# Patient Record
Sex: FEMALE | Race: WHITE | NOT HISPANIC OR LATINO | URBAN - METROPOLITAN AREA
[De-identification: names, ages, dates, MRNs, and addresses within clinical notes are randomized per-mention and may not be internally consistent; named-entity substitution may affect disease eponyms.]

---

## 2023-09-13 ENCOUNTER — EMERGENCY (EMERGENCY)
Facility: HOSPITAL | Age: 68
LOS: 1 days | Discharge: HOME | End: 2023-09-13
Attending: STUDENT IN AN ORGANIZED HEALTH CARE EDUCATION/TRAINING PROGRAM | Admitting: GENERAL PRACTICE

## 2023-09-13 VITALS
HEART RATE: 93 BPM | TEMPERATURE: 98 F | RESPIRATION RATE: 16 BRPM | OXYGEN SATURATION: 98 % | SYSTOLIC BLOOD PRESSURE: 150 MMHG | DIASTOLIC BLOOD PRESSURE: 74 MMHG

## 2023-09-13 DIAGNOSIS — E07.9 DISORDER OF THYROID, UNSPECIFIED: ICD-10-CM

## 2023-09-13 LAB
ALBUMIN SERPL ELPH-MCNC: 4 G/DL — SIGNIFICANT CHANGE UP (ref 3.3–5)
ALP SERPL-CCNC: 991 U/L — HIGH (ref 40–120)
ALT FLD-CCNC: 38 U/L — HIGH (ref 4–33)
ANION GAP SERPL CALC-SCNC: 13 MMOL/L — SIGNIFICANT CHANGE UP (ref 7–14)
AST SERPL-CCNC: 51 U/L — HIGH (ref 4–32)
BASOPHILS # BLD AUTO: 0.14 K/UL — SIGNIFICANT CHANGE UP (ref 0–0.2)
BASOPHILS NFR BLD AUTO: 1.8 % — SIGNIFICANT CHANGE UP (ref 0–2)
BILIRUB SERPL-MCNC: 0.4 MG/DL — SIGNIFICANT CHANGE UP (ref 0.2–1.2)
BLD GP AB SCN SERPL QL: NEGATIVE — SIGNIFICANT CHANGE UP
BUN SERPL-MCNC: 20 MG/DL — SIGNIFICANT CHANGE UP (ref 7–23)
CALCIUM SERPL-MCNC: 9.2 MG/DL — SIGNIFICANT CHANGE UP (ref 8.4–10.5)
CHLORIDE SERPL-SCNC: 101 MMOL/L — SIGNIFICANT CHANGE UP (ref 98–107)
CO2 SERPL-SCNC: 24 MMOL/L — SIGNIFICANT CHANGE UP (ref 22–31)
CREAT SERPL-MCNC: 0.87 MG/DL — SIGNIFICANT CHANGE UP (ref 0.5–1.3)
EGFR: 72 ML/MIN/1.73M2 — SIGNIFICANT CHANGE UP
EOSINOPHIL # BLD AUTO: 0.72 K/UL — HIGH (ref 0–0.5)
EOSINOPHIL NFR BLD AUTO: 9.1 % — HIGH (ref 0–6)
GLUCOSE SERPL-MCNC: 91 MG/DL — SIGNIFICANT CHANGE UP (ref 70–99)
HCT VFR BLD CALC: 30.1 % — LOW (ref 34.5–45)
HGB BLD-MCNC: 9 G/DL — LOW (ref 11.5–15.5)
IANC: 3.3 K/UL — SIGNIFICANT CHANGE UP (ref 1.8–7.4)
IMM GRANULOCYTES NFR BLD AUTO: 0.3 % — SIGNIFICANT CHANGE UP (ref 0–0.9)
LYMPHOCYTES # BLD AUTO: 3.22 K/UL — SIGNIFICANT CHANGE UP (ref 1–3.3)
LYMPHOCYTES # BLD AUTO: 40.7 % — SIGNIFICANT CHANGE UP (ref 13–44)
MCHC RBC-ENTMCNC: 25.7 PG — LOW (ref 27–34)
MCHC RBC-ENTMCNC: 29.9 GM/DL — LOW (ref 32–36)
MCV RBC AUTO: 86 FL — SIGNIFICANT CHANGE UP (ref 80–100)
MONOCYTES # BLD AUTO: 0.52 K/UL — SIGNIFICANT CHANGE UP (ref 0–0.9)
MONOCYTES NFR BLD AUTO: 6.6 % — SIGNIFICANT CHANGE UP (ref 2–14)
NEUTROPHILS # BLD AUTO: 3.3 K/UL — SIGNIFICANT CHANGE UP (ref 1.8–7.4)
NEUTROPHILS NFR BLD AUTO: 41.5 % — LOW (ref 43–77)
NRBC # BLD: 0 /100 WBCS — SIGNIFICANT CHANGE UP (ref 0–0)
NRBC # FLD: 0 K/UL — SIGNIFICANT CHANGE UP (ref 0–0)
PLATELET # BLD AUTO: 540 K/UL — HIGH (ref 150–400)
POTASSIUM SERPL-MCNC: 4 MMOL/L — SIGNIFICANT CHANGE UP (ref 3.5–5.3)
POTASSIUM SERPL-SCNC: 4 MMOL/L — SIGNIFICANT CHANGE UP (ref 3.5–5.3)
PROT SERPL-MCNC: 8 G/DL — SIGNIFICANT CHANGE UP (ref 6–8.3)
RBC # BLD: 3.5 M/UL — LOW (ref 3.8–5.2)
RBC # FLD: 16.1 % — HIGH (ref 10.3–14.5)
RH IG SCN BLD-IMP: POSITIVE — SIGNIFICANT CHANGE UP
SODIUM SERPL-SCNC: 138 MMOL/L — SIGNIFICANT CHANGE UP (ref 135–145)
T3 SERPL-MCNC: 86 NG/DL — SIGNIFICANT CHANGE UP (ref 80–200)
T4 AB SER-ACNC: 8.23 UG/DL — SIGNIFICANT CHANGE UP (ref 5.1–13)
T4 FREE SERPL-MCNC: 1.6 NG/DL — SIGNIFICANT CHANGE UP (ref 0.9–1.8)
TSH SERPL-MCNC: 16.64 UIU/ML — HIGH (ref 0.27–4.2)
WBC # BLD: 7.92 K/UL — SIGNIFICANT CHANGE UP (ref 3.8–10.5)
WBC # FLD AUTO: 7.92 K/UL — SIGNIFICANT CHANGE UP (ref 3.8–10.5)

## 2023-09-13 NOTE — ED PROVIDER NOTE - CLINICAL SUMMARY MEDICAL DECISION MAKING FREE TEXT BOX
pt sent in for abnormal blood count and thyroid panel; will assess in ED with cbc, cmp, thyroid panel; reassess

## 2023-09-13 NOTE — ED ADULT TRIAGE NOTE - CHIEF COMPLAINT QUOTE
Sent by her MD to be "stabilized". Liver enzymes might be elevated. Denies abdominal symptoms. Phx: HLD, hyperthyroidism

## 2023-09-13 NOTE — ED ADULT NURSE NOTE - OBJECTIVE STATEMENT
Sent by her MD to be "stabilized". Liver enzymes might be elevated. Denies abdominal symptoms. Phx: HLD, hyperthyroidism.  Patient A&OX4.  No distress noted.  Breathing non-labored.  Denies chest pain, no SOB noted.  Labs sent.  Right 20G IVL in place and tolerating well.  Plan of care in progress.

## 2023-09-13 NOTE — ED PROVIDER NOTE - NSFOLLOWUPINSTRUCTIONS_ED_ALL_ED_FT
Rest, stay hydrated, take all meds as previously prescribed, Followup with your PMD within 2 days for post hospital visit. Also recommend follow up with Endocrinology and hematology/oncology. You will be contacted to help establish these follow up appointments. Show your doctor and specialists all copies of labs given to you. Return for worsening symptoms, ex. fever, shortness of breath, chest pain, dizziness, palpitations, etc. Please read all the patient handouts.

## 2023-09-13 NOTE — ED PROVIDER NOTE - PATIENT PORTAL LINK FT
You can access the FollowMyHealth Patient Portal offered by [facility] by registering at the following website: [email or web address]. By joining [employer]’s FollowMyHealth portal, you will also be able to view your health information using other applications (apps) compatible with our system.

## 2023-09-13 NOTE — ED PROVIDER NOTE - OBJECTIVE STATEMENT
68 y/o F PMH hypothyroidism, HTN, HLD sent in by PMD office d/t anemia and abnormal thyroid testing. Pt states she had not had routine blood work in a long time. Pt notes she otherwise feels fine. Denies fever, chills, weight gain/loss, CP, SOB, palpitations, lightheadedness, melena, hematochezia.

## 2023-09-13 NOTE — ED PROVIDER NOTE - ATTENDING APP SHARED VISIT CONTRIBUTION OF CARE
I have personally performed a face to face medical and diagnostic evaluation of the patient. I have discussed with and reviewed the ACP's note and agree with the History, ROS, Physical Exam and MDM unless otherwise indicated. A brief summary of my personal evaluation and impression can be found below.      67-year-old female with no Past medical history of thyroid disorder, hypertension, hyperlipidemia sent in for anemia, abnormal thyroid testing, and elevated liver enzymes.  patient has no symptoms at this time.  States that she was told to come in.  Denies any history of daily alcohol use, IV drug use.  States that she has been taking naproxen, but not regularly.  Mainly for her arthritis.  On exam, vital signs stable, no focal findings.  Patient very well-appearing.  Plan for repeat labs.  Will call Dr. Morales to coordinate dispo, and see if he would like patient admitted for further work-up. Margaux Mccoy, ED Attending

## 2023-09-13 NOTE — ED ADULT NURSE NOTE - NSFALLUNIVINTERV_ED_ALL_ED
Bed/Stretcher in lowest position, wheels locked, appropriate side rails in place/Call bell, personal items and telephone in reach/Instruct patient to call for assistance before getting out of bed/chair/stretcher/Non-slip footwear applied when patient is off stretcher/[place] to call system/Physically safe environment - no spills, clutter or unnecessary equipment/Purposeful proactive rounding/Room/bathroom lighting operational, light cord in reach

## 2023-10-03 PROBLEM — Z00.00 ENCOUNTER FOR PREVENTIVE HEALTH EXAMINATION: Status: ACTIVE | Noted: 2023-10-03

## 2023-10-17 ENCOUNTER — APPOINTMENT (OUTPATIENT)
Dept: GASTROENTEROLOGY | Facility: CLINIC | Age: 68
End: 2023-10-17

## 2023-10-17 PROBLEM — E03.9 HYPOTHYROIDISM, UNSPECIFIED: Chronic | Status: ACTIVE | Noted: 2023-09-13

## 2023-10-17 PROBLEM — I10 ESSENTIAL (PRIMARY) HYPERTENSION: Chronic | Status: ACTIVE | Noted: 2023-09-13

## 2023-10-17 PROBLEM — E78.5 HYPERLIPIDEMIA, UNSPECIFIED: Chronic | Status: ACTIVE | Noted: 2023-09-13

## 2023-10-26 ENCOUNTER — APPOINTMENT (OUTPATIENT)
Dept: GASTROENTEROLOGY | Facility: CLINIC | Age: 68
End: 2023-10-26

## 2023-11-27 ENCOUNTER — EMERGENCY (EMERGENCY)
Facility: HOSPITAL | Age: 68
LOS: 1 days | Discharge: HOME | End: 2023-11-27
Attending: STUDENT IN AN ORGANIZED HEALTH CARE EDUCATION/TRAINING PROGRAM | Admitting: EMERGENCY MEDICINE
Payer: MEDICARE

## 2023-11-27 VITALS
RESPIRATION RATE: 17 BRPM | OXYGEN SATURATION: 100 % | SYSTOLIC BLOOD PRESSURE: 152 MMHG | TEMPERATURE: 98 F | DIASTOLIC BLOOD PRESSURE: 93 MMHG | HEART RATE: 84 BPM

## 2023-11-27 LAB
ALBUMIN SERPL ELPH-MCNC: 4.4 G/DL — SIGNIFICANT CHANGE UP (ref 3.3–5)
ALBUMIN SERPL ELPH-MCNC: 4.4 G/DL — SIGNIFICANT CHANGE UP (ref 3.3–5)
ALP SERPL-CCNC: 324 U/L — HIGH (ref 40–120)
ALP SERPL-CCNC: 324 U/L — HIGH (ref 40–120)
ALT FLD-CCNC: 20 U/L — SIGNIFICANT CHANGE UP (ref 4–33)
ALT FLD-CCNC: 20 U/L — SIGNIFICANT CHANGE UP (ref 4–33)
ANION GAP SERPL CALC-SCNC: 12 MMOL/L — SIGNIFICANT CHANGE UP (ref 7–14)
ANION GAP SERPL CALC-SCNC: 12 MMOL/L — SIGNIFICANT CHANGE UP (ref 7–14)
APAP SERPL-MCNC: <10 UG/ML — LOW (ref 15–25)
APAP SERPL-MCNC: <10 UG/ML — LOW (ref 15–25)
APTT BLD: 32.1 SEC — SIGNIFICANT CHANGE UP (ref 24.5–35.6)
APTT BLD: 32.1 SEC — SIGNIFICANT CHANGE UP (ref 24.5–35.6)
AST SERPL-CCNC: 29 U/L — SIGNIFICANT CHANGE UP (ref 4–32)
AST SERPL-CCNC: 29 U/L — SIGNIFICANT CHANGE UP (ref 4–32)
BASOPHILS # BLD AUTO: 0.07 K/UL — SIGNIFICANT CHANGE UP (ref 0–0.2)
BASOPHILS # BLD AUTO: 0.07 K/UL — SIGNIFICANT CHANGE UP (ref 0–0.2)
BASOPHILS NFR BLD AUTO: 0.6 % — SIGNIFICANT CHANGE UP (ref 0–2)
BASOPHILS NFR BLD AUTO: 0.6 % — SIGNIFICANT CHANGE UP (ref 0–2)
BILIRUB SERPL-MCNC: 0.3 MG/DL — SIGNIFICANT CHANGE UP (ref 0.2–1.2)
BILIRUB SERPL-MCNC: 0.3 MG/DL — SIGNIFICANT CHANGE UP (ref 0.2–1.2)
BUN SERPL-MCNC: 26 MG/DL — HIGH (ref 7–23)
BUN SERPL-MCNC: 26 MG/DL — HIGH (ref 7–23)
CALCIUM SERPL-MCNC: 9.4 MG/DL — SIGNIFICANT CHANGE UP (ref 8.4–10.5)
CALCIUM SERPL-MCNC: 9.4 MG/DL — SIGNIFICANT CHANGE UP (ref 8.4–10.5)
CHLORIDE SERPL-SCNC: 98 MMOL/L — SIGNIFICANT CHANGE UP (ref 98–107)
CHLORIDE SERPL-SCNC: 98 MMOL/L — SIGNIFICANT CHANGE UP (ref 98–107)
CO2 SERPL-SCNC: 28 MMOL/L — SIGNIFICANT CHANGE UP (ref 22–31)
CO2 SERPL-SCNC: 28 MMOL/L — SIGNIFICANT CHANGE UP (ref 22–31)
CREAT SERPL-MCNC: 0.78 MG/DL — SIGNIFICANT CHANGE UP (ref 0.5–1.3)
CREAT SERPL-MCNC: 0.78 MG/DL — SIGNIFICANT CHANGE UP (ref 0.5–1.3)
EGFR: 82 ML/MIN/1.73M2 — SIGNIFICANT CHANGE UP
EGFR: 82 ML/MIN/1.73M2 — SIGNIFICANT CHANGE UP
EOSINOPHIL # BLD AUTO: 0.11 K/UL — SIGNIFICANT CHANGE UP (ref 0–0.5)
EOSINOPHIL # BLD AUTO: 0.11 K/UL — SIGNIFICANT CHANGE UP (ref 0–0.5)
EOSINOPHIL NFR BLD AUTO: 0.9 % — SIGNIFICANT CHANGE UP (ref 0–6)
EOSINOPHIL NFR BLD AUTO: 0.9 % — SIGNIFICANT CHANGE UP (ref 0–6)
GGT SERPL-CCNC: 356 U/L — HIGH (ref 5–36)
GGT SERPL-CCNC: 356 U/L — HIGH (ref 5–36)
GLUCOSE SERPL-MCNC: 92 MG/DL — SIGNIFICANT CHANGE UP (ref 70–99)
GLUCOSE SERPL-MCNC: 92 MG/DL — SIGNIFICANT CHANGE UP (ref 70–99)
HCT VFR BLD CALC: 33.3 % — LOW (ref 34.5–45)
HCT VFR BLD CALC: 33.3 % — LOW (ref 34.5–45)
HGB BLD-MCNC: 10.4 G/DL — LOW (ref 11.5–15.5)
HGB BLD-MCNC: 10.4 G/DL — LOW (ref 11.5–15.5)
IANC: 7.41 K/UL — HIGH (ref 1.8–7.4)
IANC: 7.41 K/UL — HIGH (ref 1.8–7.4)
IMM GRANULOCYTES NFR BLD AUTO: 1 % — HIGH (ref 0–0.9)
IMM GRANULOCYTES NFR BLD AUTO: 1 % — HIGH (ref 0–0.9)
INR BLD: 1.14 RATIO — SIGNIFICANT CHANGE UP (ref 0.85–1.18)
INR BLD: 1.14 RATIO — SIGNIFICANT CHANGE UP (ref 0.85–1.18)
LDH SERPL L TO P-CCNC: 160 U/L — SIGNIFICANT CHANGE UP (ref 135–225)
LDH SERPL L TO P-CCNC: 160 U/L — SIGNIFICANT CHANGE UP (ref 135–225)
LYMPHOCYTES # BLD AUTO: 3.56 K/UL — HIGH (ref 1–3.3)
LYMPHOCYTES # BLD AUTO: 3.56 K/UL — HIGH (ref 1–3.3)
LYMPHOCYTES # BLD AUTO: 30.2 % — SIGNIFICANT CHANGE UP (ref 13–44)
LYMPHOCYTES # BLD AUTO: 30.2 % — SIGNIFICANT CHANGE UP (ref 13–44)
MCHC RBC-ENTMCNC: 26 PG — LOW (ref 27–34)
MCHC RBC-ENTMCNC: 26 PG — LOW (ref 27–34)
MCHC RBC-ENTMCNC: 31.2 GM/DL — LOW (ref 32–36)
MCHC RBC-ENTMCNC: 31.2 GM/DL — LOW (ref 32–36)
MCV RBC AUTO: 83.3 FL — SIGNIFICANT CHANGE UP (ref 80–100)
MCV RBC AUTO: 83.3 FL — SIGNIFICANT CHANGE UP (ref 80–100)
MONOCYTES # BLD AUTO: 0.51 K/UL — SIGNIFICANT CHANGE UP (ref 0–0.9)
MONOCYTES # BLD AUTO: 0.51 K/UL — SIGNIFICANT CHANGE UP (ref 0–0.9)
MONOCYTES NFR BLD AUTO: 4.3 % — SIGNIFICANT CHANGE UP (ref 2–14)
MONOCYTES NFR BLD AUTO: 4.3 % — SIGNIFICANT CHANGE UP (ref 2–14)
NEUTROPHILS # BLD AUTO: 7.41 K/UL — HIGH (ref 1.8–7.4)
NEUTROPHILS # BLD AUTO: 7.41 K/UL — HIGH (ref 1.8–7.4)
NEUTROPHILS NFR BLD AUTO: 63 % — SIGNIFICANT CHANGE UP (ref 43–77)
NEUTROPHILS NFR BLD AUTO: 63 % — SIGNIFICANT CHANGE UP (ref 43–77)
NRBC # BLD: 0 /100 WBCS — SIGNIFICANT CHANGE UP (ref 0–0)
NRBC # BLD: 0 /100 WBCS — SIGNIFICANT CHANGE UP (ref 0–0)
NRBC # FLD: 0 K/UL — SIGNIFICANT CHANGE UP (ref 0–0)
NRBC # FLD: 0 K/UL — SIGNIFICANT CHANGE UP (ref 0–0)
PLATELET # BLD AUTO: 796 K/UL — HIGH (ref 150–400)
PLATELET # BLD AUTO: 796 K/UL — HIGH (ref 150–400)
POTASSIUM SERPL-MCNC: 3.6 MMOL/L — SIGNIFICANT CHANGE UP (ref 3.5–5.3)
POTASSIUM SERPL-MCNC: 3.6 MMOL/L — SIGNIFICANT CHANGE UP (ref 3.5–5.3)
POTASSIUM SERPL-SCNC: 3.6 MMOL/L — SIGNIFICANT CHANGE UP (ref 3.5–5.3)
POTASSIUM SERPL-SCNC: 3.6 MMOL/L — SIGNIFICANT CHANGE UP (ref 3.5–5.3)
PROT SERPL-MCNC: 7.8 G/DL — SIGNIFICANT CHANGE UP (ref 6–8.3)
PROT SERPL-MCNC: 7.8 G/DL — SIGNIFICANT CHANGE UP (ref 6–8.3)
PROTHROM AB SERPL-ACNC: 12.7 SEC — SIGNIFICANT CHANGE UP (ref 9.5–13)
PROTHROM AB SERPL-ACNC: 12.7 SEC — SIGNIFICANT CHANGE UP (ref 9.5–13)
RBC # BLD: 4 M/UL — SIGNIFICANT CHANGE UP (ref 3.8–5.2)
RBC # BLD: 4 M/UL — SIGNIFICANT CHANGE UP (ref 3.8–5.2)
RBC # FLD: 17.6 % — HIGH (ref 10.3–14.5)
RBC # FLD: 17.6 % — HIGH (ref 10.3–14.5)
SODIUM SERPL-SCNC: 138 MMOL/L — SIGNIFICANT CHANGE UP (ref 135–145)
SODIUM SERPL-SCNC: 138 MMOL/L — SIGNIFICANT CHANGE UP (ref 135–145)
WBC # BLD: 11.78 K/UL — HIGH (ref 3.8–10.5)
WBC # BLD: 11.78 K/UL — HIGH (ref 3.8–10.5)
WBC # FLD AUTO: 11.78 K/UL — HIGH (ref 3.8–10.5)
WBC # FLD AUTO: 11.78 K/UL — HIGH (ref 3.8–10.5)

## 2023-11-27 PROCEDURE — 99285 EMERGENCY DEPT VISIT HI MDM: CPT

## 2023-11-27 PROCEDURE — 93010 ELECTROCARDIOGRAM REPORT: CPT

## 2023-11-27 PROCEDURE — 76705 ECHO EXAM OF ABDOMEN: CPT | Mod: 26

## 2023-11-27 RX ORDER — ALPRAZOLAM 0.25 MG
1 TABLET ORAL ONCE
Refills: 0 | Status: DISCONTINUED | OUTPATIENT
Start: 2023-11-27 | End: 2023-11-27

## 2023-11-27 RX ADMIN — Medication 1 MILLIGRAM(S): at 23:24

## 2023-11-27 NOTE — ED PROVIDER NOTE - PHYSICAL EXAMINATION
GENERAL: NAD, lying in bed comfortably  HEAD:  Atraumatic, normocephalic  EYES: EOMI, PERRLA, conjunctiva and sclera clear  Face: desquamated skin on chin with skin appearing pink no blisters  NECK: Supple, trachea midline, no JVD  HEART: Regular rate and rhythm, no murmurs, rubs, or gallops  LUNGS: Unlabored respirations.  Clear to auscultation bilaterally, no crackles, wheezing, or rhonchi  ABDOMEN: Soft, nontender, nondistended, +BS  EXTREMITIES: 2+ peripheral pulses bilaterally. No clubbing, cyanosis, or edema; Left hand actively desquamating with old scabbed blisters  NERVOUS SYSTEM:  A&Ox3, moving all extremities, no focal deficits   SKIN: No rashes or lesions

## 2023-11-27 NOTE — ED PROVIDER NOTE - PROGRESS NOTE DETAILS
[facility] PGY3: Called Dr Grigsby multiple times, left msg for call back. @ [phone]. Called office call line as well w/o response. Unable to elicit further information. Consider CDU for possible endocrine given the concern for elevated TSH despite med adjustment. Pt agreeable to stay for this if necessary. Resend TSH. If TSH improving, ok to dc likely with f/u to endo/GI. Salazar PGY3: TSH >36, will keep in CDU for endo f/u as patient has poor f/u at home.

## 2023-11-27 NOTE — ED PROVIDER NOTE - ATTENDING CONTRIBUTION TO CARE
I have personally performed a face to face medical and diagnostic evaluation of the patient. I have discussed with and reviewed the Resident's note and agree with the History, ROS, Physical Exam and MDM unless otherwise indicated. A brief summary of my personal evaluation and impression can be found below.    Aliya PERRY: 68F hx of hypothyroidism, htn, hld, presents with a cc of c/f elevated liver enzymes, pt reports has been following with her pmd for the last few weeks and was told to come to ED for eval given c/f high liver enzymes reports decided to come today because now she was ready to stay in hospital she also reports she developed a rash on her face that spread to her hands, no fever. Pt also reports she has had a itchy rash to her legs and her rash on her hands is itchy as well. Pt also reports she has been poorly compliant with her thyroid medications as well. No new chest pain sob or garcia no nausea no vomiting. Despite multiple attempts at clarification pt is intermittently tangental, unclear exactly why she came to ED today specifically, but does not appear she has acutely worsening symptoms, no recent travel.     All other ROS negative, except as above and as per HPI and ROS section.    VITALS: Initial triage and subsequent vitals have been reviewed by me.  GEN APPEARANCE: Alert, non-toxic, well-appearing, NAD.  HEAD: Atraumatic.  EYES: PERRLa, EOMI, vision grossly intact.   NECK: Supple  CV: RRR, S1S2, no c/r/m/g. Cap refill <2 seconds. No bruits.   LUNGS: CTAB. No abnormal breath sounds.  ABDOMEN: Soft, NTND. No guarding or rebound.   MSK/EXT: No spinal or extremity point tenderness. No CVA ttp. Pelvis stable. No peripheral edema.  NEURO: Alert, follows commands. Weight bearing normal. Speech normal. Sensation and motor normal x4 extremities.   SKIN: trace desquamating skin rash w mild excoriations and erythremia to b/l hands, appears c/w itch and scratching   PSYCH: Appropriate    Plan/MDM: exam vss non toxic PE as above ddx c/f abnormal lab tests rash appears c/w possible dermatitis w itch component possibly w superimposed cellulitis though is cool to touch, given limited history and unclear prior outpatient work up will check basic labs, ruq sono give meds as needed, reassess, attempt to reach pmd for collateral and dispo planning, check thyroid, reassess, dispo accordingly thereafter,

## 2023-11-27 NOTE — ED ADULT NURSE NOTE - NSFALLUNIVINTERV_ED_ALL_ED
Bed/Stretcher in lowest position, wheels locked, appropriate side rails in place/Call bell, personal items and telephone in reach/Instruct patient to call for assistance before getting out of bed/chair/stretcher/Non-slip footwear applied when patient is off stretcher/[place] to call system/Physically safe environment - no spills, clutter or unnecessary equipment/Purposeful proactive rounding/Room/bathroom lighting operational, light cord in reach

## 2023-11-27 NOTE — ED PROVIDER NOTE - NSFOLLOWUPINSTRUCTIONS_ED_ALL_ED_FT
Abnormal Labs    - Follow up with GASTROENTEROLOGY about liver tests.  - Follow up with an ENDOCRINOLOGIST about thyroid issues. Please call below clinic number to make an appointment.    - Follow up with Dr Grigsby as appropriate for further care.    Rest, drink plenty of fluids.  Advance activity as tolerated.  Continue all previously prescribed medications as directed.  Follow up with your primary care physician in 48-72 hours- bring copies of your results.  Return to the ER for worsening or persistent symptoms, and/or ANY NEW OR CONCERNING SYMPTOMS.

## 2023-11-27 NOTE — ED ADULT TRIAGE NOTE - CHIEF COMPLAINT QUOTE
pt states he was told her liver enzymes were elevated. pt also arrives with rash to Left arm/hand with peeling skin with lesions.

## 2023-11-27 NOTE — ED PROVIDER NOTE - OBJECTIVE STATEMENT
68-year-old female PMH hypothyroidism hypertension hyperlipidemia presenting at PMD request for elevated liver enzymes and rash on hands  Patient states 3 to 4 weeks ago she was at her PMDs office elevated liver enzymes and was requested to come into the emergency department for further evaluation.  Patient did not go at that time and came today after physicians repeated requests due to rash on hands.  Patient developed a rash on hands and face on Monday which she attributes to either using Clorox without gloves rash on hand and resembles previous rashes and taking a medication which made her sweaty and nauseous in the past.  Patient developed a rash on chin and blistering rash with edema to left hand.  Patient's skin desquamated of the chin and has some skin desquamation on the left hand with scabbed over blisters.  The patient visited her PMD on Tuesday who prescribed her with a steroid taper started at 40 patient is currently on 20 twice daily.  Patient states rash on face and hand are improving.  Patient denies chest pain shortness of breath abdominal pain nausea vomiting diarrhea paresthesias rash or blisters in other locations headache dizziness.

## 2023-11-27 NOTE — ED ADULT NURSE NOTE - OBJECTIVE STATEMENT
Pt arrives to 12a, ambulatory at baseline, A&Ox4. Pt C/O a rash x3 days with peeling skin and lesion on the left wrist/ hand x a few days. Pt states "My liver enzymes are elevated". Pt denies CP, HA, SOB, fevers, chills, vomiting, and diarrhea. Pt denies eating new foods. Respirations are even and unlabored, NSR on monitor. Abdomen is soft and nondistended. Capillary refill is 2 seconds bilaterally. 20G IV placed in right AC. Labs drawn and sent. Medicated as per MD order. Bed in lowest position, safety maintained.

## 2023-11-28 VITALS
TEMPERATURE: 98 F | OXYGEN SATURATION: 100 % | SYSTOLIC BLOOD PRESSURE: 116 MMHG | RESPIRATION RATE: 16 BRPM | DIASTOLIC BLOOD PRESSURE: 64 MMHG | HEART RATE: 67 BPM

## 2023-11-28 LAB
T3 SERPL-MCNC: <30 NG/DL — LOW (ref 80–200)
T3 SERPL-MCNC: <30 NG/DL — LOW (ref 80–200)
T4 AB SER-ACNC: 2.8 UG/DL — LOW (ref 5.1–13)
T4 AB SER-ACNC: 2.8 UG/DL — LOW (ref 5.1–13)
T4 FREE SERPL-MCNC: 0.5 NG/DL — LOW (ref 0.9–1.8)
T4 FREE SERPL-MCNC: 0.5 NG/DL — LOW (ref 0.9–1.8)
TSH SERPL-MCNC: 36.98 UIU/ML — HIGH (ref 0.27–4.2)
TSH SERPL-MCNC: 36.98 UIU/ML — HIGH (ref 0.27–4.2)

## 2023-11-28 PROCEDURE — 99236 HOSP IP/OBS SAME DATE HI 85: CPT

## 2023-11-28 RX ORDER — LEVOTHYROXINE SODIUM 125 MCG
100 TABLET ORAL DAILY
Refills: 0 | Status: DISCONTINUED | OUTPATIENT
Start: 2023-11-28 | End: 2023-11-28

## 2023-11-28 RX ORDER — LEVOTHYROXINE SODIUM 125 MCG
125 TABLET ORAL DAILY
Refills: 0 | Status: DISCONTINUED | OUTPATIENT
Start: 2023-11-28 | End: 2023-12-01

## 2023-11-28 RX ORDER — ALPRAZOLAM 0.25 MG
1 TABLET ORAL ONCE
Refills: 0 | Status: DISCONTINUED | OUTPATIENT
Start: 2023-11-28 | End: 2023-11-28

## 2023-11-28 RX ORDER — ALPRAZOLAM 0.25 MG
1 TABLET ORAL
Refills: 0 | Status: COMPLETED | OUTPATIENT
Start: 2023-11-28 | End: 2023-12-05

## 2023-11-28 RX ADMIN — Medication 500 MILLIGRAM(S): at 00:39

## 2023-11-28 RX ADMIN — Medication 1 MILLIGRAM(S): at 11:08

## 2023-11-28 RX ADMIN — Medication 20 MILLIGRAM(S): at 11:08

## 2023-11-28 RX ADMIN — Medication 500 MILLIGRAM(S): at 01:09

## 2023-11-28 NOTE — ED CDU PROVIDER DISPOSITION NOTE - NSFOLLOWUPINSTRUCTIONS_ED_ALL_ED_FT
REST, NO STRENUOUS ACTIVITY  PLEASE CONTINUE ALL CURRENT MEDICATIONS INCLUDING YOUR LEVOTHYROXINE 125MCG DAILY  PLEASE START TRIAMCINOLONE OINTMENT TWICE A A DAY AS DIRECTED  FOLLOW UP WITH DR. JUAREZ NEXT WEEK  RETURN TO ER FOR WORSENING SYMPTOMS     Rash    A rash is a change in the color of the skin. A rash can also change the way your skin feels. There are many different conditions and factors that can cause a rash.     Follow these instructions at home:  Pay attention to any changes in your symptoms. Follow these instructions to help with your condition:    Medicine    Take or apply over-the-counter and prescription medicines only as told by your health care provider. These may include:    Corticosteroid cream.  Anti-itch lotions.  Oral antihistamines.    Skin Care    Apply cool compresses to the affected areas.  Try taking a bath with:  Epsom salts. Follow the instructions on the packaging. You can get these at your local pharmacy or grocery store.  Baking soda. Pour a small amount into the bath as told by your health care provider.  Colloidal oatmeal. Follow the instructions on the packaging. You can get this at your local pharmacy or grocery store.  Try applying baking soda paste to your skin. Stir water into baking soda until it reaches a paste-like consistency.  Do not scratch or rub your skin.  Avoid covering the rash. Make sure the rash is exposed to air as much as possible.    General instructions    Avoid hot showers or baths, which can make itching worse. A cold shower may help.  Avoid scented soaps, detergents, and perfumes. Use gentle soaps, detergents, perfumes, and other cosmetic products.  Avoid any substance that causes your rash. Keep a journal to help track what causes your rash. Write down:  What you eat.  What cosmetic products you use.  What you drink.  What you wear. This includes jewelry.  Keep all follow-up visits as told by your health care provider. This is important.    Contact a health care provider if:  You sweat at night.  You lose weight.  You urinate more than normal.  You feel weak.  You vomit.  Your skin or the whites of your eyes look yellow (jaundice).  Your skin:  Tingles.  Is numb.  Your rash:  Does not go away after several days.  Gets worse.  You are:  Unusually thirsty.  More tired than normal.  You have:  New symptoms.  Pain in your abdomen.  A fever.  Diarrhea.    Get help right away if:  You develop a rash that covers all or most of your body. The rash may or may not be painful.  You develop blisters that:  Are on top of the rash.  Grow larger or grow together.  Are painful.  Are inside your nose or mouth.  You develop a rash that:  Looks like purple pinprick-sized spots all over your body.  Has a “bull's eye” or looks like a target.  Is not related to sun exposure, is red and painful, and causes your skin to peel.    ADDITIONAL NOTES AND INSTRUCTIONS    Please follow up with your Primary MD in 24-48 hr.  Seek immediate medical care for any new/worsening signs or symptoms.     Document Released: 12/8/2003 Document Revised: 5/23/2017 Document Reviewed: 5/4/2016  [employer] Interactive Patient Education ©2019 Elsevier Inc. This information is not intended to replace advice given to you by your health care provider. Make sure you discuss any questions you have with your health care provider.    Dermatología [facility]  Dermatología  [address] [place] [address]  Teléfono: [phone]  Fax: [phone]    [facility]ía [facility]  [facility]  [address] [address]  Teléfono: [phone]  Fax: [phone]    Dermatología [facility]  Dermatología  [address] [address]  Teléfono: [phone]  Fax:  Tiempo de seguimiento:    [facility] [place]  Dermatología  [address] [place] [address]  Teléfono: [phone]  Fax: [phone]  Tiempo de seguimiento:    Erupción    Michael erupción es un cambio en el color de la piel. Michael erupción también puede cambiar la forma en que se siente la piel. Hay muchas condiciones y factores diferentes que pueden causar michael erupción.    Siga estas instrucciones en casa:  Preste atención a cualquier cambio en nury síntomas. Siga estas instrucciones para ayudar con diez condición:    Medicamento    Pentress o aplique medicamentos de venta ira y recetados solo según lo indique diez proveedor de atención médica. Estos pueden incluir:    Crema de corticosteroides.  Lociones anti-picazón.  Antihistamínicos orales.    Protección de la piel    Aplique compresas frías en las áreas afectadas.  Intente wallace un baño con:  Sales de Epsom. Siga las instrucciones del paquete. Puede conseguirlos en diez farmacia o supermercado local.  Bicarbonato de sodio. Vierta michael pequeña cantidad en el baño según le indique diez proveedor de atención médica.  Virgen coloidal. Siga las instrucciones del paquete. Puede conseguirlo en diez farmacia o supermercado local.  Intente aplicar pasta de bicarbonato de sodio en diez piel. Agrega agua al bicarbonato de sodio hasta que tenga michael consistencia pastosa.  No se rasque ni frote la piel.  Evite cubrir la erupción. Asegúrese de que la erupción esté expuesta al aire tanto rakesh sea posible.    Instrucciones generales    Evite las duchas o kenna calientes, que pueden empeorar la picazón. Michael ducha fría puede ayudar.  Evite los jabones, detergentes y perfumes perfumados. Use jabones, detergentes, perfumes y otros productos cosméticos suaves.  Evite cualquier sustancia que le cause sarpullido. Lleve un diario para ayudar a rastrear las causas de diez erupción. Anote:  Lo que comes.  Qué productos cosméticos usas.  Que estas tomando.  Lo que vistes. Lawson incluye joyas.  Asista a todas las visitas de seguimiento que le indique diez proveedor de atención médica. Lawson es importante.    Comuníquese con un proveedor de atención médica si:  Sudas de noche.  Tú pierdes peso.  Orinas más de lo normal.  Te sientes débil.  Vomitas.  Diez piel o el taylor de nury ojos se omar amarillos (ictericia).  Tu piel:  Hormigueo  Está entumecido.  Tu sarpullido:  No desaparece después de varios días.  Empeora.  Usted está:  Inusualmente sediento.  Más cansado de lo normal.  Tu tienes:  Nuevos síntomas.  Dolor en diez abdomen.  Fiebre  Diarrea.    Obtenga ayuda de inmediato si:  Desarrolla michael erupción que cubre todo o la mayor parte de diez cuerpo. La erupción puede ser dolorosa o no.  Desarrolla ampollas que:  Están encima del sarpullido.  Crezcan más o crezcan juntos.  Son dolorosos.  Están dentro de diez nariz o boca.  Desarrolla michael erupción que:  Parece manchas moradas del tamaño de un alfiler en todo el cuerpo.  Tiene un "josey de buey" o parece un objetivo.  No está relacionado con la exposición al sol, es dyson y doloroso, y hace que diez piel se pele.    NOTAS E INSTRUCCIONES ADICIONALES    Nicol un seguimiento con diez médico de cabecera en 24-48 horas.  Busque atención médica inmediata ante cualquier signo o síntoma nuevo o que empeore.    Documento publicado: 8/12/2003 Documento revisado: 23/5/2017 Documento revisado: 4/5/2016  Educación interactiva para el paciente de Elsevier © 2019 Elsevier Inc. Esta información no pretende reemplazar los consejos que le haya brindado diez proveedor de atención médica. Asegúrese de discutir cualquier pregunta que tenga con diez proveedor de atención médica.

## 2023-11-28 NOTE — ED CDU PROVIDER INITIAL DAY NOTE - ATTENDING APP SHARED VISIT CONTRIBUTION OF CARE
I have personally performed a face to face medical and diagnostic evaluation of the patient. I have discussed with and reviewed the HEATHER's note and agree with the History, ROS, Physical Exam and MDM unless otherwise indicated. A brief summary of my personal evaluation and impression can be found below.    Please see initial ED provider note for further details.

## 2023-11-28 NOTE — ED CDU PROVIDER INITIAL DAY NOTE - PHYSICAL EXAMINATION
CONSTITUTIONAL: Well-appearing; well-nourished obese female; in no apparent distress. Non-toxic appearing.   NEURO: Alert & oriented. Gait steady without assistance. Sensory and motor functions are grossly intact.  PSYCH: Mood appropriate. Thought processes intact.   NECK: Supple  CARD: Regular rate and rhythm, no murmurs  RESP: No accessory muscle use; breath sounds clear and equal bilaterally; no wheezes, rhonchi, or rales     ABD: Rotund. Soft; non-distended; non-tender. No guarding or rebound.   MUSCULOSKELETAL/EXTREMITIES: FROM in all four extremities; no extremity edema.  SKIN: Desquamating skin noted to left hand with scabs. No blisters.

## 2023-11-28 NOTE — ED CDU PROVIDER DISPOSITION NOTE - PATIENT PORTAL LINK FT
You can access the FollowMyHealth Patient Portal offered by [facility] by registering at the following website: [email or web address]. By joining [employer]’s FollowMyHealth portal, you will also be able to view your health information using other applications (apps) compatible with our system.

## 2023-11-28 NOTE — ED CDU PROVIDER INITIAL DAY NOTE - NS ED MD PROGRESS NOTE ADD
Tai Florence is a 9 m.o.  male with:   1. Supracristal ventricular septal defect and coarctation of the aorta  - s/p patch closure of ventricular septal defect, primary closure of patent foramen ovale and coarctation repair with end to end anastomosis and anterior patch augmentation (9/23/22)   2. Bicuspid aortic valve  - trivial to mild insufficiency  3. 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 RLL atelectasis, improving    Plan:  Neuro:   - Prn Ibuprofen and tylenol   - Morphine and oxycodone prn  - Melatonin prn  Resp:   - Goal sat > 92%, may have oxygen as needed  - Ventilation plan: room air  - Daily CXR  - Dexamethasone - plan for slow taper for 3 more days  - CPT  CVS:   - Goal SBP < 120 mmHg  - Inotropic support: none  - Hydralyzine prn SBP >120 mmHg  - Echo prior to discharge  - Rhythm: Sinus  - Lasix PO q8  - Enalapril to 0.2 mg/kg/day  FEN/GI:   - NG feeds: Enfamil 20 kcal/oz 180 ml q4  - Speech evaluation/MBSS later this week  - Monitor electrolytes and replace as needed  - GI prophylaxis: Famotidine PO  - Miralax prn  Heme/ID:  - Goal Hct> 30  - Anticoagulation needs: None  - S/p Ancef prophylaxis   Plastics:  - PIV, prelim plan to dc cvl   Add Progress Note...

## 2023-11-28 NOTE — ED CDU PROVIDER INITIAL DAY NOTE - PROGRESS NOTE DETAILS
Patient has no specific complaints this morning.  Although patient results discussed.  Including LFTs improvement and ultrasound results.  Patient states that as she has not been feeling well for the last 2 weeks she has not been taking her Synthroid for her cholesterol medication.  Patient's TSH was extremely elevated and rest of thyroid test were low.  Will restart patient on her Synthroid 100 mg and will speak with Pato Pedersen this morning to discuss follow-up plan.  On exam patient has no significant abdominal tenderness and vital signs are unremarkable.

## 2023-11-28 NOTE — ED CDU PROVIDER INITIAL DAY NOTE - OBJECTIVE STATEMENT
68-year-old female PMH hypothyroidism hypertension hyperlipidemia presenting at PMD request for elevated liver enzymes and rash on hands  Patient states 3 to 4 weeks ago she was at her PMDs office elevated liver enzymes and was requested to come into the emergency department for further evaluation.  Patient did not go at that time and came today after physicians repeated requests due to rash on hands.  Patient developed a rash on hands and face on Monday which she attributes to either using Clorox without gloves rash on hand and resembles previous rashes and taking a medication which made her sweaty and nauseous in the past.  Patient developed a rash on chin and blistering rash with edema to left hand.   The patient visited her PMD on Tuesday (1 weeks ago) who prescribed her with a steroid taper started at 40 patient is currently on 20 twice daily.  Patient states rash on face and hand are improving. Patient denies chest pain shortness of breath abdominal pain nausea vomiting diarrhea paresthesias rash or blisters in other locations headache dizziness.    CDU AG Cummings: Agree with above.  Upon chart review liver enzymes have been elevated dating back to 2 months and was noted to be higher than they are today.  Upon further questioning, patient states that rash and elevated liver enzymes have been ongoing for the past several weeks but patient did not have the time to come into the hospital like her doctor requested.  Patient is followed by Dr. Morales outside ([phone]).  In the ED, patient underwent lab analysis and upper quadrant ultrasound.  Labs are significant for elevated TSH of 36.9 with subtherapeutic thyroid levels.  Right upper quadrant ultrasound within normal limits, incidental finding of 2 cm complex renal interpolar cyst noted.  Patient placed in CDU for endocrine consult given poorly controlled thyroid disease.  Will also attempt to  reach patient's primary care doctor to gain clarity as to why he sent patient to ED for admission as patient is unable to clarify.

## 2023-11-28 NOTE — ED CDU PROVIDER INITIAL DAY NOTE - CLINICAL SUMMARY MEDICAL DECISION MAKING FREE TEXT BOX
68-year-old female PMH hypothyroidism hypertension hyperlipidemia presenting at PMD request for elevated liver enzymes and rash on hands  Patient states 3 to 4 weeks ago she was at her PMDs office elevated liver enzymes and was requested to come into the emergency department for further evaluation. In the ED, patient underwent lab analysis and upper quadrant ultrasound.  Labs are significant for elevated TSH of 36.9 with subtherapeutic thyroid levels.  Right upper quadrant ultrasound within normal limits, incidental finding of 2 cm complex renal interpolar cyst noted.  Patient placed in CDU for endocrine consult given poorly controlled thyroid disease.  Will also attempt to  reach patient's primary care doctor to gain clarity as to why he sent patient to ED for admission as patient is unable to clarify.

## 2023-11-28 NOTE — ED CDU PROVIDER DISPOSITION NOTE - CLINICAL COURSE
68-year-old female PMH hypothyroidism hypertension hyperlipidemia presenting at PMD request for elevated liver enzymes and rash on handsPatient states 3 to 4 weeks ago she was at her PMDs office elevated liver enzymes and was requested to come into the emergency department for further evaluation.  Patient did not go at that time and came today after physicians repeated requests due to rash on hands.    The patient visited her PMD on Tuesday (1 weeks ago) who prescribed her with a steroid taper started at 40 patient is currently on 20 twice daily.  Patient states rash on face and hand are improving. Upon chart review liver enzymes have been elevated dating back to 2 months and was noted to be higher than they are today.  Upon further questioning, patient states that rash and elevated liver enzymes have been ongoing for the past several weeks but patient did not have the time to come into the hospital like her doctor requested.  Patient is followed by Dr. Morales outside ([phone]).  In the ED, patient underwent lab analysis and upper quadrant ultrasound.  Labs are significant for elevated TSH of 36.9 with subtherapeutic thyroid levels.  Right upper quadrant ultrasound within normal limits, incidental finding of 2 cm complex renal interpolar cyst noted.  Patient placed in CDU for endocrine consult given poorly controlled thyroid disease.  Spoke with Dr. Moraels - initially sent patient to ER for derm eval. Spoke with derm and sent picture - recommending dc on triamcinolone and outpt follow up.

## 2023-11-28 NOTE — ED CDU PROVIDER INITIAL DAY NOTE - NSICDXPASTSURGICALHX_GEN_ALL_CORE_FT
Medication: Methylphenidate HCl ER 54 MG TABLET SR 24 HR        Per patient's insurance, Methylphenidate HCl ER 54 MG TABLET SR 24 HR is excluded from coverage.    See the list below for the medications that are covered by patient's insurance:      Tammy German     Please advise. When changing to a preferred medication send a new rx to patient's pharmacy and update this encounter. Thanks.     PAST SURGICAL HISTORY:  No significant past surgical history

## 2023-12-12 ENCOUNTER — APPOINTMENT (OUTPATIENT)
Dept: GASTROENTEROLOGY | Facility: CLINIC | Age: 68
End: 2023-12-12
Payer: MEDICARE

## 2023-12-12 VITALS
DIASTOLIC BLOOD PRESSURE: 67 MMHG | HEIGHT: 63 IN | WEIGHT: 170 LBS | TEMPERATURE: 97.6 F | OXYGEN SATURATION: 97 % | SYSTOLIC BLOOD PRESSURE: 139 MMHG | BODY MASS INDEX: 30.12 KG/M2 | HEART RATE: 93 BPM

## 2023-12-12 DIAGNOSIS — Z80.0 FAMILY HISTORY OF MALIGNANT NEOPLASM OF DIGESTIVE ORGANS: ICD-10-CM

## 2023-12-12 DIAGNOSIS — Z87.898 PERSONAL HISTORY OF OTHER SPECIFIED CONDITIONS: ICD-10-CM

## 2023-12-12 DIAGNOSIS — Z78.9 OTHER SPECIFIED HEALTH STATUS: ICD-10-CM

## 2023-12-12 DIAGNOSIS — K58.2 MIXED IRRITABLE BOWEL SYNDROME: ICD-10-CM

## 2023-12-12 DIAGNOSIS — E03.9 HYPOTHYROIDISM, UNSPECIFIED: ICD-10-CM

## 2023-12-12 DIAGNOSIS — R74.8 ABNORMAL LEVELS OF OTHER SERUM ENZYMES: ICD-10-CM

## 2023-12-12 DIAGNOSIS — K59.00 CONSTIPATION, UNSPECIFIED: ICD-10-CM

## 2023-12-12 DIAGNOSIS — F17.200 NICOTINE DEPENDENCE, UNSPECIFIED, UNCOMPLICATED: ICD-10-CM

## 2023-12-12 PROCEDURE — 99204 OFFICE O/P NEW MOD 45 MIN: CPT

## 2023-12-12 RX ORDER — LEVOTHYROXINE SODIUM 125 UG/1
125 TABLET ORAL
Refills: 0 | Status: ACTIVE | COMMUNITY

## 2023-12-19 ENCOUNTER — APPOINTMENT (OUTPATIENT)
Dept: GASTROENTEROLOGY | Facility: CLINIC | Age: 68
End: 2023-12-19

## 2024-03-19 ENCOUNTER — APPOINTMENT (OUTPATIENT)
Dept: GASTROENTEROLOGY | Facility: CLINIC | Age: 69
End: 2024-03-19

## 2024-07-05 ENCOUNTER — APPOINTMENT (OUTPATIENT)
Dept: GASTROENTEROLOGY | Facility: CLINIC | Age: 69
End: 2024-07-05
Payer: MEDICARE

## 2024-07-05 VITALS
TEMPERATURE: 97.3 F | BODY MASS INDEX: 30.48 KG/M2 | WEIGHT: 172 LBS | SYSTOLIC BLOOD PRESSURE: 121 MMHG | HEART RATE: 97 BPM | DIASTOLIC BLOOD PRESSURE: 75 MMHG | RESPIRATION RATE: 16 BRPM | OXYGEN SATURATION: 94 % | HEIGHT: 63 IN

## 2024-07-05 DIAGNOSIS — Z80.0 FAMILY HISTORY OF MALIGNANT NEOPLASM OF DIGESTIVE ORGANS: ICD-10-CM

## 2024-07-05 DIAGNOSIS — K58.2 MIXED IRRITABLE BOWEL SYNDROME: ICD-10-CM

## 2024-07-05 DIAGNOSIS — R68.81 EARLY SATIETY: ICD-10-CM

## 2024-07-05 DIAGNOSIS — E03.9 HYPOTHYROIDISM, UNSPECIFIED: ICD-10-CM

## 2024-07-05 DIAGNOSIS — R74.8 ABNORMAL LEVELS OF OTHER SERUM ENZYMES: ICD-10-CM

## 2024-07-05 PROCEDURE — 99214 OFFICE O/P EST MOD 30 MIN: CPT

## 2024-07-05 RX ORDER — LACTOBACILLUS RHAMNOSUS GG 10B CELL
CAPSULE ORAL
Qty: 30 | Refills: 0 | Status: ACTIVE | OUTPATIENT
Start: 2024-07-05

## 2024-07-05 RX ORDER — METHYLCELLULOSE 2 G/10.2G
POWDER, FOR SOLUTION ORAL
Qty: 1 | Refills: 2 | Status: ACTIVE | OUTPATIENT
Start: 2024-07-05

## 2024-07-05 RX ORDER — FAMOTIDINE 40 MG/1
40 TABLET, FILM COATED ORAL DAILY
Qty: 30 | Refills: 3 | Status: ACTIVE | COMMUNITY
Start: 2024-07-05 | End: 1900-01-01

## 2024-07-05 RX ORDER — POLYETHYLENE GLYCOL 3350, SODIUM SULFATE, SODIUM CHLORIDE, POTASSIUM CHLORIDE, ASCORBIC ACID, SODIUM ASCORBATE 140-9-5.2G
140 KIT ORAL
Qty: 1 | Refills: 0 | Status: ACTIVE | COMMUNITY
Start: 2024-07-05 | End: 1900-01-01

## 2024-07-06 LAB
ALBUMIN SERPL ELPH-MCNC: 4 G/DL
ALP BLD-CCNC: 772 U/L
ALT SERPL-CCNC: 29 U/L
AST SERPL-CCNC: 29 U/L
BILIRUB DIRECT SERPL-MCNC: 0.1 MG/DL
BILIRUB INDIRECT SERPL-MCNC: 0.1 MG/DL
BILIRUB SERPL-MCNC: 0.2 MG/DL
CRP SERPL-MCNC: 28 MG/L
GGT SERPL-CCNC: 422 U/L
HCT VFR BLD CALC: 30.9 %
HGB BLD-MCNC: 9.1 G/DL
MCHC RBC-ENTMCNC: 26 PG
MCHC RBC-ENTMCNC: 29.4 GM/DL
MCV RBC AUTO: 88.3 FL
PLATELET # BLD AUTO: 585 K/UL
PROT SERPL-MCNC: 7.6 G/DL
RBC # BLD: 3.5 M/UL
RBC # FLD: 15.9 %
WBC # FLD AUTO: 8.72 K/UL

## 2024-07-09 LAB — MITOCHONDRIA AB SER IF-ACNC: ABNORMAL

## 2024-08-26 ENCOUNTER — APPOINTMENT (OUTPATIENT)
Dept: GASTROENTEROLOGY | Facility: AMBULATORY MEDICAL SERVICES | Age: 69
End: 2024-08-26

## 2024-08-27 ENCOUNTER — APPOINTMENT (OUTPATIENT)
Dept: GASTROENTEROLOGY | Facility: CLINIC | Age: 69
End: 2024-08-27
Payer: MEDICARE

## 2024-08-27 VITALS
OXYGEN SATURATION: 96 % | RESPIRATION RATE: 16 BRPM | SYSTOLIC BLOOD PRESSURE: 127 MMHG | WEIGHT: 174 LBS | DIASTOLIC BLOOD PRESSURE: 80 MMHG | BODY MASS INDEX: 30.83 KG/M2 | HEIGHT: 63 IN | HEART RATE: 82 BPM | TEMPERATURE: 97.7 F

## 2024-08-27 DIAGNOSIS — R14.0 ABDOMINAL DISTENSION (GASEOUS): ICD-10-CM

## 2024-08-27 DIAGNOSIS — R74.8 ABNORMAL LEVELS OF OTHER SERUM ENZYMES: ICD-10-CM

## 2024-08-27 DIAGNOSIS — E03.9 HYPOTHYROIDISM, UNSPECIFIED: ICD-10-CM

## 2024-08-27 DIAGNOSIS — Z80.0 FAMILY HISTORY OF MALIGNANT NEOPLASM OF DIGESTIVE ORGANS: ICD-10-CM

## 2024-08-27 PROCEDURE — 99214 OFFICE O/P EST MOD 30 MIN: CPT

## 2024-08-27 NOTE — HISTORY OF PRESENT ILLNESS
[FreeTextEntry1] : Patient is a 68-year-old female referred by Dr. Morales for evaluation of abnormal LFTs.  Patient was seen in the emergency room on 11/27.  She does complain of alternating diarrhea and constipation which she attributes to anxiety.  The symptoms usually occur when she goes to a party for an affair.  She has had the symptoms for about 5 to 6 years.  She denies seeing any blood or mucus in the stool.  She has no abdominal cramps.  She has never had a colonoscopy. Patient presented to the emergency room with complaints of a rash on her hands.  This started several days prior and she was given a Medrol Dosepak with some improvement in her symptoms.  Sonogram in the emergency room revealed a normal gallbladder and a normal common bile duct. Patient has a history of hypothyroidism and was taking thyroid supplements.  She was on 75 mcg daily  Patient had blood work in the past.  On 9/13/2023 alkaline phosphatase was 992, AST/ALT 51/38.  On 10/16 alkaline phosphatase was 1021, GGTP was 356. On 11/27 when she was in the emergency room, patient had alkaline phosphatase of 327, T4 was 2.4, TSH 36, WBC 11k, H/H 10.4/33.3, platelets 796k. Blood work from yesterday at Dr. Morales's office alkaline phosphatase was 888.  The rest of the CHEM screen profile was normal.  She was mildly anemic. Several weeks ago Synthroid was raised from 75 mcg to 125 mcg.  7/5/2024-patient with alternating diarrhea and constipation.  This seems to have started in 2020 around the time of COVID.  She denies seeing any blood or mucus in the stool.  She has never had a colonoscopy. Patient also has a history of a markedly elevated alkaline phosphatase.  She was supposed to get an MRI or MRCP but she never went.  She had an abdominal sonogram in November 2023 that revealed a normal liver.  She also has a history of hypothyroidism with an elevated TSH.  Her Synthroid was increased.  She was also told of having osteoarthritis by her rheumatologist.  She takes naproxen twice daily.  She has no heartburn but does complain of early satiety.  8/27/2024-patient was due for colonoscopy and upper endoscopy yesterday.  She had some reaction to the preparation or could not clean herself out completely.  She canceled the procedure. Patient still has abdominal distention.  She has had a markedly elevated alkaline phosphatase and GGTP with a positive antimitochondrial antibody.  She did not go for the MRI and did not do an FOBT.  She has no abdominal pain.

## 2024-08-27 NOTE — PHYSICAL EXAM
[Alert] : alert [Normal Voice/Communication] : normal voice/communication [Healthy Appearing] : healthy appearing [No Acute Distress] : no acute distress [Sclera] : the sclera and conjunctiva were normal [Hearing Threshold Finger Rub Not [place]] : hearing was normal [Normal Lips/Gums] : the lips and gums were normal [Oropharynx] : the oropharynx was normal [Normal Appearance] : the appearance of the neck was normal [No Neck Mass] : no neck mass was observed [No Respiratory Distress] : no respiratory distress [No Acc Muscle Use] : no accessory muscle use [Respiration, Rhythm And Depth] : normal respiratory rhythm and effort [Auscultation Breath Sounds / Voice Sounds] : lungs were clear to auscultation bilaterally [Heart Rate And Rhythm] : heart rate was normal and rhythm regular [Normal S1, S2] : normal S1 and S2 [Murmurs] : no murmurs [Bowel Sounds] : normal bowel sounds [Abdomen Tenderness] : non-tender [No Masses] : no abdominal mass palpated [Abdomen Soft] : soft [] : no hepatosplenomegaly [Oriented To Time, Place, And Person] : oriented to person, place, and time [de-identified] : Distended abdomen

## 2024-08-27 NOTE — ASSESSMENT
[FreeTextEntry1] : Patient's colonoscopy and upper endoscopy will be rescheduled.  We will follow-up her LFTs, thyroid profile, CRP, and tumor markers.  CBC and iron studies will be ordered.  B12 and folate will be ordered.  Patient will be referred for CAT scan of the abdomen and pelvis. She does have an appointment with hepatologist in 6 weeks.  We will try and expedite the appointment. Colonoscopy and upper endoscopy will be rescheduled after the above results are back.

## 2024-08-28 ENCOUNTER — RESULT REVIEW (OUTPATIENT)
Age: 69
End: 2024-08-28

## 2024-08-30 ENCOUNTER — APPOINTMENT (OUTPATIENT)
Dept: GASTROENTEROLOGY | Facility: CLINIC | Age: 69
End: 2024-08-30

## 2024-09-04 ENCOUNTER — APPOINTMENT (OUTPATIENT)
Dept: CT IMAGING | Facility: CLINIC | Age: 69
End: 2024-09-04

## 2024-09-04 PROCEDURE — 74177 CT ABD & PELVIS W/CONTRAST: CPT

## 2024-09-12 ENCOUNTER — APPOINTMENT (OUTPATIENT)
Dept: HEPATOLOGY | Facility: CLINIC | Age: 69
End: 2024-09-12

## 2024-09-20 NOTE — ED ADULT NURSE NOTE - CCCP TRG CHIEF CMPLNT
APC on site: KEYLA Felipe abnormal labs with rash

## 2024-10-02 ENCOUNTER — APPOINTMENT (OUTPATIENT)
Dept: GASTROENTEROLOGY | Facility: AMBULATORY MEDICAL SERVICES | Age: 69
End: 2024-10-02

## 2024-10-03 ENCOUNTER — APPOINTMENT (OUTPATIENT)
Dept: HEPATOLOGY | Facility: CLINIC | Age: 69
End: 2024-10-03

## 2025-01-21 NOTE — ED PROVIDER NOTE - CLINICAL SUMMARY MEDICAL DECISION MAKING FREE TEXT BOX
HISTORY OF PRESENT ILLNESS  Chief Complaint   Patient presents with    Weight Check     Down 5     Clau Santamaria is a 39 year old female here for follow up with medical weight loss program for the treatment of overweight, obesity, or morbid obesity.     Down 5 lbs (f/u from 9/2024 via telemedicine)   Compliant on zepbound 10mg weekly   Tolerating well, helping with decreasing appetite and no side effects     Got a new job- working completely remote, but feels like she is not moving as much as she previously was doing    A1c reduced to 5.1% (out of the prediabetic range)   Has reduced eating out, has been cooking most of food   Exercise/Activity: <1x/ week, via walking, not doing anything routine as far as exercise  Nutrition: eating regular meals, +protein, minimal veggies. not tracking reports  Meals out per week on average: 1  Stress is manageable   Sleep: 5-6 hours/night, waking up feeling rested most days    Denies chest pain, shortness of breath, dizziness, blurred vision, headache, paresthesia, nausea/vomiting.     Breakfast Lunch Dinner Snacks Fluids   Reviewed              Wt Readings from Last 6 Encounters:   01/22/25 194 lb (88 kg)   12/02/24 197 lb 3.2 oz (89.4 kg)   10/17/24 198 lb (89.8 kg)   04/22/24 220 lb (99.8 kg)   02/09/24 234 lb (106.1 kg)   05/03/23 240 lb (108.9 kg)          REVIEW OF SYSTEMS  GENERAL: feels well otherwise, denied any fevers chills or night sweats   LUNGS: denies shortness of breath  CARDIOVASCULAR: denies chest pain  GI: denies abdominal pain  MUSCULOSKELETAL: denies back pain, joint pains   PSYCH: denies change in behavior or mood, denies feeling sad or depressed    EXAM  /68   Pulse 94   Resp 16   Ht 5' 7\" (1.702 m)   Wt 194 lb (88 kg)   LMP 01/02/2025 (Exact Date)   BMI 30.38 kg/m²       GENERAL: well developed, well nourished, in no apparent distress, A/O x3  SKIN: no rashes, no suspicious lesions  HEENT: atraumatic, normocephalic, OP-clear, PERRLA  NECK:  supple, no adenopathy  LUNGS: CTA in all fields, breathing non labored  CARDIO: RRR without murmur  GI: +BS, NT/ND, no masses or HSM  EXTREMITIES: no cyanosis, no clubbing, no edema    Lab Results   Component Value Date    GLU 91 12/02/2024    BUN 15 12/02/2024    BUNCREA 12.9 07/23/2020    CREATSERUM 0.75 12/02/2024    ANIONGAP 3 12/02/2024    GFRNAA 115 09/28/2021    GFRAA 133 09/28/2021    CA 9.3 12/02/2024    OSMOCALC 286 12/02/2024    ALKPHO 23 (L) 12/02/2024    AST 13 12/02/2024    ALT 10 12/02/2024    BILT 0.5 12/02/2024    TP 7.2 12/02/2024    ALB 4.4 12/02/2024    GLOBULIN 2.8 12/02/2024     12/02/2024    K 3.8 12/02/2024     12/02/2024    CO2 23.0 12/02/2024     Lab Results   Component Value Date     12/02/2024    A1C 5.1 12/02/2024     Lab Results   Component Value Date    CHOLEST 153 12/02/2024    TRIG 109 12/02/2024    HDL 48 12/02/2024    LDL 85 12/02/2024    VLDL 17 12/02/2024    NONHDLC 105 12/02/2024     No results found for: \"B12\", \"VITB12\"  No results found for: \"VITD\", \"QVITD\", \"GPYR10YK\"    Medications Ordered Prior to Encounter[1]    ASSESSMENT/PLAN    ICD-10-CM    1. Therapeutic drug monitoring  Z51.81       2. Obesity with body mass index (BMI) of 35.0 to 39.9 without comorbidity  E66.9       3. Stress  F43.9       4. Low HDL (under 40)  E78.6       5. Prediabetes  R73.03           PLAN   Initial Weight Data and Goal Weight Loss:  Initial consult: #244 lbs on 6/2021  Weight Calculations  Initial Weight: 244 lbs  Initial Weight Date: 06/01/21  Today's Weight: 194 lbs  5% Goal: 12.2  10% Goal: 24.4  Total Weight Loss: 50 lbs  Total weight loss: Down 5 lbs total, Net loss 50 lbs  Will increase medications: zepbound 12.5mg weekly  --advised of side effects and adverse effects of this medication  Contradictions: none, pre-glucoma ocular hypertension, per patient resolved, stopped due phentermine due to side effects    Reviewed labs  Body composition: body fat 37%, visceral fat  9, muscle mass 32 lbs  HLD  Stable, (low HDL)- encouraged exercise, follows with PCP  Hx of prediabetes, reviewed last A1c 5.1% on 12/2024-was previously 5.9% on 2/2023   encourage physical exercise and strength training   Wrote out macros and encouraged tracking  Nutrition: Low carb diet, recommended to eat breakfast daily/ regular protein intake  Follow up with dietitian and psychologist as recommended.  Discussed the role of sleep and stress in weight management.  Counseled on comprehensive weight loss plan including attention to nutrition, exercise and behavior/stress management for success. See patient instruction below for more details.  Discussed strategies to overcome barriers to successful weight loss and weight maintenance  FITTE: ACSM recommendations (150-300 minutes/ week in active weight loss)   Weight Loss Consent to treat reviewed and signed.    Total time spent on chart review, pre-charting, obtaining history, counseling, and educating, reviewing labs was 30 minutes.       NOTE TO PATIENT: The 21st Century Cures Act makes clinical notes like these available to patients in the interest of transparency. Clinical notes are medical documents used by physicians and care providers to communicate with each other. These documents include medical language and terminology, abbreviations, and treatment information that may sound technical and at times possibly unfamiliar. In addition, at times, the verbiage may appear blunt or direct. These documents are one tool providers use to communicate relevant information and clinical opinions of the care providers in a way that allows common understanding of the clinical context.     There are no Patient Instructions on file for this visit.    No follow-ups on file.    Patient verbalizes understanding.    Jenna Adan, KEILY             [1]   Current Outpatient Medications on File Prior to Visit   Medication Sig Dispense Refill    Multiple Vitamins-Minerals  (MULTI-VITAMIN/MINERALS) Oral Tab Take 1 tablet by mouth daily.      ZEPBOUND 10 MG/0.5ML Subcutaneous Solution Auto-injector 10 mg.      tranexamic acid (LYSTEDA) 650 MG Oral Tab Take 2 tablets (1,300 mg total) by mouth every 8 (eight) hours. For up to 5 days every month with menses 90 tablet 2    Fluticasone Propionate 50 MCG/ACT Nasal Suspension SPRAY 1 to 2 SPRAYS INTO EACH NOSTRIL EVERY DAY        No current facility-administered medications on file prior to visit.      Due to the patient's elevated liver enzymes and rash CBC CMP hepatic enzymes will be ordered with a right upper quadrant ultrasound.